# Patient Record
Sex: MALE | Race: ASIAN | NOT HISPANIC OR LATINO | URBAN - METROPOLITAN AREA
[De-identification: names, ages, dates, MRNs, and addresses within clinical notes are randomized per-mention and may not be internally consistent; named-entity substitution may affect disease eponyms.]

---

## 2020-03-04 ENCOUNTER — EMERGENCY (EMERGENCY)
Facility: HOSPITAL | Age: 65
LOS: 1 days | Discharge: HOME | End: 2020-03-04
Attending: EMERGENCY MEDICINE | Admitting: STUDENT IN AN ORGANIZED HEALTH CARE EDUCATION/TRAINING PROGRAM
Payer: SELF-PAY

## 2020-03-04 VITALS
HEART RATE: 66 BPM | DIASTOLIC BLOOD PRESSURE: 77 MMHG | TEMPERATURE: 98 F | RESPIRATION RATE: 16 BRPM | SYSTOLIC BLOOD PRESSURE: 171 MMHG | OXYGEN SATURATION: 100 %

## 2020-03-04 LAB
ALBUMIN SERPL ELPH-MCNC: 4.3 G/DL — SIGNIFICANT CHANGE UP (ref 3.3–5)
ALP SERPL-CCNC: 76 U/L — SIGNIFICANT CHANGE UP (ref 40–120)
ALT FLD-CCNC: 29 U/L — SIGNIFICANT CHANGE UP (ref 4–41)
ANION GAP SERPL CALC-SCNC: 14 MMO/L — SIGNIFICANT CHANGE UP (ref 7–14)
AST SERPL-CCNC: 17 U/L — SIGNIFICANT CHANGE UP (ref 4–40)
B PERT DNA SPEC QL NAA+PROBE: NOT DETECTED — SIGNIFICANT CHANGE UP
BASE EXCESS BLDV CALC-SCNC: 0.9 MMOL/L — SIGNIFICANT CHANGE UP
BASOPHILS # BLD AUTO: 0.03 K/UL — SIGNIFICANT CHANGE UP (ref 0–0.2)
BASOPHILS NFR BLD AUTO: 0.5 % — SIGNIFICANT CHANGE UP (ref 0–2)
BILIRUB SERPL-MCNC: 0.4 MG/DL — SIGNIFICANT CHANGE UP (ref 0.2–1.2)
BLOOD GAS VENOUS - CREATININE: 1.03 MG/DL — SIGNIFICANT CHANGE UP (ref 0.5–1.3)
BUN SERPL-MCNC: 12 MG/DL — SIGNIFICANT CHANGE UP (ref 7–23)
C PNEUM DNA SPEC QL NAA+PROBE: NOT DETECTED — SIGNIFICANT CHANGE UP
CALCIUM SERPL-MCNC: 9.3 MG/DL — SIGNIFICANT CHANGE UP (ref 8.4–10.5)
CHLORIDE BLDV-SCNC: 106 MMOL/L — SIGNIFICANT CHANGE UP (ref 96–108)
CHLORIDE SERPL-SCNC: 102 MMOL/L — SIGNIFICANT CHANGE UP (ref 98–107)
CO2 SERPL-SCNC: 22 MMOL/L — SIGNIFICANT CHANGE UP (ref 22–31)
CREAT SERPL-MCNC: 1.02 MG/DL — SIGNIFICANT CHANGE UP (ref 0.5–1.3)
EOSINOPHIL # BLD AUTO: 0.45 K/UL — SIGNIFICANT CHANGE UP (ref 0–0.5)
EOSINOPHIL NFR BLD AUTO: 6.8 % — HIGH (ref 0–6)
FLUAV H1 2009 PAND RNA SPEC QL NAA+PROBE: NOT DETECTED — SIGNIFICANT CHANGE UP
FLUAV H1 RNA SPEC QL NAA+PROBE: NOT DETECTED — SIGNIFICANT CHANGE UP
FLUAV H3 RNA SPEC QL NAA+PROBE: NOT DETECTED — SIGNIFICANT CHANGE UP
FLUAV SUBTYP SPEC NAA+PROBE: NOT DETECTED — SIGNIFICANT CHANGE UP
FLUBV RNA SPEC QL NAA+PROBE: NOT DETECTED — SIGNIFICANT CHANGE UP
GAS PNL BLDV: 138 MMOL/L — SIGNIFICANT CHANGE UP (ref 136–146)
GLUCOSE BLDV-MCNC: 213 MG/DL — HIGH (ref 70–99)
GLUCOSE SERPL-MCNC: 218 MG/DL — HIGH (ref 70–99)
HADV DNA SPEC QL NAA+PROBE: NOT DETECTED — SIGNIFICANT CHANGE UP
HCO3 BLDV-SCNC: 24 MMOL/L — SIGNIFICANT CHANGE UP (ref 20–27)
HCOV PNL SPEC NAA+PROBE: SIGNIFICANT CHANGE UP
HCT VFR BLD CALC: 40.5 % — SIGNIFICANT CHANGE UP (ref 39–50)
HCT VFR BLDV CALC: 44.4 % — SIGNIFICANT CHANGE UP (ref 39–51)
HGB BLD-MCNC: 14.1 G/DL — SIGNIFICANT CHANGE UP (ref 13–17)
HGB BLDV-MCNC: 14.5 G/DL — SIGNIFICANT CHANGE UP (ref 13–17)
HMPV RNA SPEC QL NAA+PROBE: NOT DETECTED — SIGNIFICANT CHANGE UP
HPIV1 RNA SPEC QL NAA+PROBE: NOT DETECTED — SIGNIFICANT CHANGE UP
HPIV2 RNA SPEC QL NAA+PROBE: NOT DETECTED — SIGNIFICANT CHANGE UP
HPIV3 RNA SPEC QL NAA+PROBE: NOT DETECTED — SIGNIFICANT CHANGE UP
HPIV4 RNA SPEC QL NAA+PROBE: NOT DETECTED — SIGNIFICANT CHANGE UP
IMM GRANULOCYTES NFR BLD AUTO: 0.3 % — SIGNIFICANT CHANGE UP (ref 0–1.5)
LACTATE BLDV-MCNC: 1.8 MMOL/L — SIGNIFICANT CHANGE UP (ref 0.5–2)
LYMPHOCYTES # BLD AUTO: 1.98 K/UL — SIGNIFICANT CHANGE UP (ref 1–3.3)
LYMPHOCYTES # BLD AUTO: 29.9 % — SIGNIFICANT CHANGE UP (ref 13–44)
MAGNESIUM SERPL-MCNC: 2.1 MG/DL — SIGNIFICANT CHANGE UP (ref 1.6–2.6)
MCHC RBC-ENTMCNC: 29.7 PG — SIGNIFICANT CHANGE UP (ref 27–34)
MCHC RBC-ENTMCNC: 34.8 % — SIGNIFICANT CHANGE UP (ref 32–36)
MCV RBC AUTO: 85.4 FL — SIGNIFICANT CHANGE UP (ref 80–100)
MONOCYTES # BLD AUTO: 0.56 K/UL — SIGNIFICANT CHANGE UP (ref 0–0.9)
MONOCYTES NFR BLD AUTO: 8.5 % — SIGNIFICANT CHANGE UP (ref 2–14)
NEUTROPHILS # BLD AUTO: 3.58 K/UL — SIGNIFICANT CHANGE UP (ref 1.8–7.4)
NEUTROPHILS NFR BLD AUTO: 54 % — SIGNIFICANT CHANGE UP (ref 43–77)
NRBC # FLD: 0 K/UL — SIGNIFICANT CHANGE UP (ref 0–0)
PCO2 BLDV: 48 MMHG — SIGNIFICANT CHANGE UP (ref 41–51)
PH BLDV: 7.35 PH — SIGNIFICANT CHANGE UP (ref 7.32–7.43)
PLATELET # BLD AUTO: 200 K/UL — SIGNIFICANT CHANGE UP (ref 150–400)
PMV BLD: 9.4 FL — SIGNIFICANT CHANGE UP (ref 7–13)
PO2 BLDV: 44 MMHG — HIGH (ref 35–40)
POTASSIUM BLDV-SCNC: 3.8 MMOL/L — SIGNIFICANT CHANGE UP (ref 3.4–4.5)
POTASSIUM SERPL-MCNC: 4 MMOL/L — SIGNIFICANT CHANGE UP (ref 3.5–5.3)
POTASSIUM SERPL-SCNC: 4 MMOL/L — SIGNIFICANT CHANGE UP (ref 3.5–5.3)
PROT SERPL-MCNC: 8.1 G/DL — SIGNIFICANT CHANGE UP (ref 6–8.3)
RBC # BLD: 4.74 M/UL — SIGNIFICANT CHANGE UP (ref 4.2–5.8)
RBC # FLD: 12.7 % — SIGNIFICANT CHANGE UP (ref 10.3–14.5)
RSV RNA SPEC QL NAA+PROBE: NOT DETECTED — SIGNIFICANT CHANGE UP
RV+EV RNA SPEC QL NAA+PROBE: DETECTED — HIGH
SAO2 % BLDV: 76.4 % — SIGNIFICANT CHANGE UP (ref 60–85)
SODIUM SERPL-SCNC: 138 MMOL/L — SIGNIFICANT CHANGE UP (ref 135–145)
TROPONIN T, HIGH SENSITIVITY: 15 NG/L — SIGNIFICANT CHANGE UP (ref ?–14)
TROPONIN T, HIGH SENSITIVITY: 16 NG/L — SIGNIFICANT CHANGE UP (ref ?–14)
WBC # BLD: 6.62 K/UL — SIGNIFICANT CHANGE UP (ref 3.8–10.5)
WBC # FLD AUTO: 6.62 K/UL — SIGNIFICANT CHANGE UP (ref 3.8–10.5)

## 2020-03-04 PROCEDURE — 71250 CT THORAX DX C-: CPT | Mod: 26

## 2020-03-04 PROCEDURE — 71046 X-RAY EXAM CHEST 2 VIEWS: CPT | Mod: 26

## 2020-03-04 PROCEDURE — 99218: CPT

## 2020-03-04 RX ORDER — AZITHROMYCIN 500 MG/1
500 TABLET, FILM COATED ORAL ONCE
Refills: 0 | Status: COMPLETED | OUTPATIENT
Start: 2020-03-04 | End: 2020-03-04

## 2020-03-04 RX ORDER — IPRATROPIUM/ALBUTEROL SULFATE 18-103MCG
3 AEROSOL WITH ADAPTER (GRAM) INHALATION ONCE
Refills: 0 | Status: COMPLETED | OUTPATIENT
Start: 2020-03-04 | End: 2020-03-04

## 2020-03-04 RX ORDER — IPRATROPIUM/ALBUTEROL SULFATE 18-103MCG
3 AEROSOL WITH ADAPTER (GRAM) INHALATION EVERY 6 HOURS
Refills: 0 | Status: DISCONTINUED | OUTPATIENT
Start: 2020-03-04 | End: 2020-03-11

## 2020-03-04 RX ORDER — IPRATROPIUM/ALBUTEROL SULFATE 18-103MCG
3 AEROSOL WITH ADAPTER (GRAM) INHALATION EVERY 6 HOURS
Refills: 0 | Status: DISCONTINUED | OUTPATIENT
Start: 2020-03-04 | End: 2020-03-04

## 2020-03-04 RX ORDER — ACETAMINOPHEN 500 MG
975 TABLET ORAL ONCE
Refills: 0 | Status: COMPLETED | OUTPATIENT
Start: 2020-03-04 | End: 2020-03-04

## 2020-03-04 RX ORDER — AZITHROMYCIN 500 MG/1
1 TABLET, FILM COATED ORAL
Qty: 4 | Refills: 0
Start: 2020-03-04 | End: 2020-03-07

## 2020-03-04 RX ADMIN — Medication 3 MILLILITER(S): at 16:12

## 2020-03-04 RX ADMIN — Medication 3 MILLILITER(S): at 09:36

## 2020-03-04 RX ADMIN — Medication 975 MILLIGRAM(S): at 23:53

## 2020-03-04 RX ADMIN — Medication 3 MILLILITER(S): at 22:29

## 2020-03-04 RX ADMIN — Medication 50 MILLIGRAM(S): at 13:05

## 2020-03-04 RX ADMIN — Medication 3 MILLILITER(S): at 13:05

## 2020-03-04 RX ADMIN — Medication 1 TABLET(S): at 12:31

## 2020-03-04 RX ADMIN — AZITHROMYCIN 500 MILLIGRAM(S): 500 TABLET, FILM COATED ORAL at 12:31

## 2020-03-04 NOTE — ED CDU PROVIDER INITIAL DAY NOTE - PROGRESS NOTE DETAILS
Patient received at sign out, pending am echo and possible Stress test. Pt Lungs CTA b/l. Spoke to patient son on phone, states he will come in AM. Pt resting comfortably. Will continue with current cdu plan and monitor closely.

## 2020-03-04 NOTE — ED CDU PROVIDER INITIAL DAY NOTE - NS ED ROS FT
ROS:  GENERAL: No fever, no chills  EYES: no change in vision  HEENT: no trouble swallowing, no trouble speaking  CARDIAC: +chest pain  PULMONARY: +cough, +shortness of breath, +wheezes  GI: no abdominal pain, no nausea, no vomiting, no diarrhea, no constipation  : No dysuria, no frequency, no change in appearance, or odor of urine  SKIN: no rashes  NEURO: no headache  MSK: No joint pain

## 2020-03-04 NOTE — ED PROVIDER NOTE - OBJECTIVE STATEMENT
65yom w/ HTN has been having 3 days of dry cough and shortness of breath associated with sharp right sided chest pain worse with inspiration and coughing. Gradual onset of symptoms with no provoking event, constant since onset, taking OTC cough medicines without relief. No fevers, chills, sick contacts, travel or known exposures. Does endorses intermittent chest pain and shortness of breath on and off for several months.

## 2020-03-04 NOTE — ED CDU PROVIDER INITIAL DAY NOTE - ATTENDING CONTRIBUTION TO CARE
Seen and examined, have discussed plan of care with PA.   I agree with note as stated above, having amended the EMR as needed to reflect the findings.   GEN - NAD; well appearing; A+O x3   HEAD - NC/AT     EYES - EOMI, no conjunctival pallor, no scleral icterus  ENT -   mucous membranes  moist , no discharge      NECK - Neck supple  PULM - CTA b/l,  symmetric breath sounds  COR -  RRR, S1 S2, no murmurs  ABD -diffuse wheezes.   BACK - no CVA tenderness, nontender spine     EXTREMS - no edema, no deformity, warm and well perfused    SKIN - no rash or bruising      NEUROLOGIC - alert, sensation nl, motor 5/5 RUE/LUE/RLE/LLE

## 2020-03-04 NOTE — ED CDU PROVIDER INITIAL DAY NOTE - PHYSICAL EXAMINATION
Vital signs reviewed.   CONSTITUTIONAL: Well-appearing; well-nourished; in no apparent distress. Non-toxic appearing.   HEAD: Normocephalic, atraumatic.  EYES: PERRL, EOM intact, conjunctiva and sclera WNL.  ENT: normal nose; no rhinorrhea; normal pharynx with no tonsillar hypertrophy, no erythema, no exudate, no lymphadenopathy.  NECK/LYMPH: Supple; non-tender; no cervical lymphadenopathy.  CARD: Normal S1, S2; RRR. No lower extremity edema or calf tenderness eddie.   RESP: Normal chest excursion with respiration; diffuse wheezing  ABD/GI: soft, non-distended; non-tender; no palpable organomegaly, no pulsatile mass.  EXT/MS: moves all extremities; distal pulses are normal, no pedal edema.  SKIN: Normal for age and race; warm; dry; good turgor; no apparent lesions or exudate noted.  NEURO: Awake, alert, oriented x 3, no gross deficits, CN II-XII grossly intact, no motor or sensory deficit noted.  PSYCH: Normal mood; appropriate affect.

## 2020-03-04 NOTE — ED CDU PROVIDER INITIAL DAY NOTE - OBJECTIVE STATEMENT
65yom w/ HTN has been having 3 days of dry cough and shortness of breath associated with sharp right sided chest pain worse with inspiration and coughing. Gradual onset of symptoms with no provoking event, constant since onset, taking OTC cough medicines without relief. No fevers, chills, sick contacts, travel or known exposures. Does endorses intermittent chest pain and shortness of breath on and off for several months.    CDU PRASHANT Brown: Agree with above except as noted. Pt is a 65 year old male no known PMHx, moved here from [place], here c/o right sided chest pain w/ associated shortness of breath x several days. No recent travel or sick contacts. No lower extremity edema or calf pain. No known cardiac hx however has never seen a cardiologist. Pt was diagnosed clinically w/ PNA given RLL rales on exam. EKG done showed T waves in V5 and V6 with no prior. Pt sent to CDU for cardiology eval and stress testing. On exam, pt has noted to developed diffuse wheezes. Plan for duonebs, steroids, and cardiology eval in CDU.

## 2020-03-04 NOTE — CONSULT NOTE ADULT - SUBJECTIVE AND OBJECTIVE BOX
HISTORY OF PRESENT ILLNESS: HPI:    Patient speaks Adina, requests his daughter in law Adriano translate our conversation.     65 y.o male with PMHX of HTN who presented to ED c/o 3 days of dry cough and shortness of breath associated with sharp right sided chest pain worse with inspiration and coughing. Patient states his symptoms have progressively been getting worse and therefore decided to come to ED for further evaluation. Per daughter-in-law Adriano, pt was resting in couch yesterday when he started to c/o sob with breathing accompanied by cough. Patient states the pain is intermittent without pressure or radiation to neck/jaw/arm and that chest pain correlates with cough. Patient denies f/u with PCP or cardiology in the past. Per Adriano, pt does not regularly follow with doctors back home in [facility] and not since arriving in [place] in November of last year. Per patient, his symptoms have been present for several weeks to months, but in the past few day have become worse. Denies cardiac work up and hx of MI, CAD, stents. Denies palma, palpitations, le edema or weakness, abd pain, n/v.       PAST MEDICAL & SURGICAL HISTORY:  HTN (hypertension)  No significant past surgical history      MEDICATIONS:  MEDICATIONS  (STANDING):  albuterol/ipratropium for Nebulization. 3 milliLiter(s) Nebulizer every 6 hours      Allergies    No Known Allergies    Intolerances      FAMILY HISTORY:    Non-contributary for premature coronary disease or sudden cardiac death    SOCIAL HISTORY:    [ X] Non-smoker  [ ] Smoker  [ ] Alcohol    FLU VACCINE THIS YEAR STARTS IN AUGUST:  [ ] Yes    [X ] No    IF OVER 65 HAVE YOU EVER HAD A PNA VACCINE:  [ ] Yes    [ X] No       [ ] N/A      REVIEW OF SYSTEMS:  [X ]chest pain  [ X ]shortness of breath  [  ]palpitations  [  ]syncope  [ ]near syncope [ ]upper extremity weakness   [ ] lower extremity weakness  [  ]diplopia  [  ]altered mental status   [  ]fevers  [ ]chills [ ]nausea  [ ]vomitting  [  ]dysphagia    [ ]abdominal pain  [ ]melena  [ ]BRBPR    [  ]epistaxis  [  ]rash    [ ]lower extremity edema      [X ] All others negative	  [ ] Unable to obtain      LABS:	 	    CARDIAC MARKERS:                         14.1   6.62  )-----------( 200      ( 04 Mar 2020 09:30 )             40.5     Hb Trend: 14.1<--    03-04    138  |  102  |  12  ----------------------------<  218<H>  4.0   |  22  |  1.02    Ca    9.3      04 Mar 2020 09:30  Mg     2.1     03-04    TPro  8.1  /  Alb  4.3  /  TBili  0.4  /  DBili  x   /  AST  17  /  ALT  29  /  AlkPhos  76  03-04    Creatinine Trend: 1.02<--    Coags:      proBNP: Serum Pro-Brain Natriuretic Peptide: 10.84 pg/mL (03-04 @ 09:30)    Lipid Profile:   HgA1c:   TSH:       PHYSICAL EXAM:  T(C): 36.2 (03-04-20 @ 13:03), Max: 36.7 (03-04-20 @ 09:02)  HR: 72 (03-04-20 @ 13:03) (66 - 72)  BP: 186/87 (03-04-20 @ 13:03) (171/77 - 186/87)  RR: 14 (03-04-20 @ 13:03) (14 - 16)  SpO2: 99% (03-04-20 @ 13:03) (99% - 100%)  Wt(kg): --     I&O's Summary      Gen: Appears well in NAD  HEENT:  (-)icterus (-)pallor  CV: N S1 S2 1/6 TOMER (+)2 Pulses B/l  Resp:  diminished at bases,  B/L, normal effort  GI: (+) BS Soft, NT, ND  Lymph:  (-)Edema, (-)obvious lymphadenopathy  Skin: Warm to touch, Normal turgor  Psych: Appropriate mood and affect        TELEMETRY: 	      ECG:  	    RADIOLOGY:         CXR:     ASSESSMENT/PLAN: 	65y Male HISTORY OF PRESENT ILLNESS: HPI:    Patient speaks Adina, requests his daughter in law Adriano translate our conversation.     65 y.o male with PMHX of HTN who presented to ED c/o 3 days of dry cough and shortness of breath associated with sharp right sided chest pain worse with inspiration and coughing. Patient states his symptoms have progressively been getting worse and therefore decided to come to ED for further evaluation. Per daughter-in-law Adriano, pt was resting in couch yesterday when he started to c/o sob with breathing accompanied by cough. Patient states the pain is intermittent without pressure or radiation to neck/jaw/arm and that chest pain correlates with cough. Patient denies f/u with PCP or cardiology in the past. Per Adriano, pt does not regularly follow with doctors back home in [facility] and not since arriving in [place] in November of last year. Per patient, his symptoms have been present for several weeks to months, but in the past few day have become worse. Denies cardiac work up and hx of MI, CAD, stents. Denies palma, palpitations, le edema or weakness, abd pain, n/v. Cardiology consulted for r/o ACS.       PAST MEDICAL & SURGICAL HISTORY:  HTN (hypertension)  No significant past surgical history      MEDICATIONS:  MEDICATIONS  (STANDING):  albuterol/ipratropium for Nebulization. 3 milliLiter(s) Nebulizer every 6 hours      Allergies    No Known Allergies    Intolerances      FAMILY HISTORY:    Non-contributary for premature coronary disease or sudden cardiac death    SOCIAL HISTORY:    [ X] Non-smoker  [ ] Smoker  [ ] Alcohol    FLU VACCINE THIS YEAR STARTS IN AUGUST:  [ ] Yes    [X ] No    IF OVER 65 HAVE YOU EVER HAD A PNA VACCINE:  [ ] Yes    [ X] No       [ ] N/A      REVIEW OF SYSTEMS:  [X ]chest pain  [ X ]shortness of breath  [  ]palpitations  [  ]syncope  [ ]near syncope [ ]upper extremity weakness   [ ] lower extremity weakness  [  ]diplopia  [  ]altered mental status   [  ]fevers  [ ]chills [ ]nausea  [ ]vomitting  [  ]dysphagia    [ ]abdominal pain  [ ]melena  [ ]BRBPR    [  ]epistaxis  [  ]rash    [ ]lower extremity edema      [X ] All others negative	  [ ] Unable to obtain      LABS:	 	    CARDIAC MARKERS:                         14.1   6.62  )-----------( 200      ( 04 Mar 2020 09:30 )             40.5     Hb Trend: 14.1<--    03-04    138  |  102  |  12  ----------------------------<  218<H>  4.0   |  22  |  1.02    Ca    9.3      04 Mar 2020 09:30  Mg     2.1     03-04    TPro  8.1  /  Alb  4.3  /  TBili  0.4  /  DBili  x   /  AST  17  /  ALT  29  /  AlkPhos  76  03-04    Creatinine Trend: 1.02<--    Coags:      proBNP: Serum Pro-Brain Natriuretic Peptide: 10.84 pg/mL (03-04 @ 09:30)    Lipid Profile:   HgA1c:   TSH:       PHYSICAL EXAM:  T(C): 36.2 (03-04-20 @ 13:03), Max: 36.7 (03-04-20 @ 09:02)  HR: 72 (03-04-20 @ 13:03) (66 - 72)  BP: 186/87 (03-04-20 @ 13:03) (171/77 - 186/87)  RR: 14 (03-04-20 @ 13:03) (14 - 16)  SpO2: 99% (03-04-20 @ 13:03) (99% - 100%)  Wt(kg): --     I&O's Summary      Gen: Appears well in NAD  HEENT:  (-)icterus (-)pallor  CV: N S1 S2 1/6 TOMER (+)2 Pulses B/l  Resp:  diminished at bases,  B/L, normal effort  GI: (+) BS Soft, NT, ND  Lymph:  (-)Edema, (-)obvious lymphadenopathy  Skin: Warm to touch, Normal turgor  Psych: Appropriate mood and affect    TELEMETRY: 	  NSR     ECG:  	NSR, with LVH and Narrow QRS     RADIOLOGY:         CXR:   < from: Xray Chest 2 Views PA/Lat (03.04.20 @ 11:30) >  IMPRESSION: Normal chest    PATIENCE CARLTON M.D., ATTENDING RADIOLOGIST  This document has been electronically signed. Mar  4 2020 11:29AM    < end of copied text >      ECHO  PENDING     ASSESSMENT/PLAN: 	    65 y.o male with PMHX of HTN who presented to ED c/o 3 days of dry cough and shortness of breath associated with sharp right sided chest pain worse with inspiration and coughing. Patient states his symptoms have progressively been getting worse and therefore decided to come to ED for further evaluation. Per daughter-in-law Adriano, pt was resting in couch yesterday when he started to c/o sob with breathing accompanied by cough. Patient states the pain is intermittent without pressure or radiation to neck/jaw/arm and that chest pain correlates with cough. Patient denies f/u with PCP or cardiology in the past. Per Adriano, pt does not regularly follow with doctors back home in [facility] and not since arriving in [place] in November of last year. Per patient, his symptoms have been present for several weeks to months, but in the past few day have become worse. Denies cardiac work up and hx of MI, CAD, stents. Cardiology consulted for r/o ACS.       -- pt with intermittent cp associated with sob and cough  -- presented to ED c/o sob, cough, has scattered wheezing throughout on eval   -- cont duonebs, consider pulm consult in setting of cough/wheezing  -- cont tele, currently NSR   -- check echo eval lv function, structural heart  -- if respiratory status improves would check NST eval for CAD (pt w/no prev hx or recent work up)  -- further reccs pending above   -- appreciate CDU care and f/u HISTORY OF PRESENT ILLNESS: HPI:    Patient speaks Adina, requests his daughter in law Adriano translate our conversation.     65 y.o male with PMHX of HTN who presented to ED c/o 3 days of dry cough and shortness of breath associated with sharp right sided chest pain worse with inspiration and coughing. Patient states his symptoms have progressively been getting worse and therefore decided to come to ED for further evaluation. Per daughter-in-law Adriano, pt was resting in couch yesterday when he started to c/o sob with breathing accompanied by cough. Patient states the pain is intermittent without pressure or radiation to neck/jaw/arm and that chest pain correlates with cough. Patient denies f/u with PCP or cardiology in the past. Per Adriano, pt does not regularly follow with doctors back home in [facility] and not since arriving in [place] in November of last year. Per patient, his symptoms have been present for several weeks to months, but in the past few day have become worse. Denies cardiac work up and hx of MI, CAD, stents. Denies palma, palpitations, le edema or weakness, abd pain, n/v. Cardiology consulted for r/o ACS.       PAST MEDICAL & SURGICAL HISTORY:  HTN (hypertension)  No significant past surgical history      MEDICATIONS:  MEDICATIONS  (STANDING):  albuterol/ipratropium for Nebulization. 3 milliLiter(s) Nebulizer every 6 hours      Allergies    No Known Allergies    Intolerances      FAMILY HISTORY:    Non-contributary for premature coronary disease or sudden cardiac death    SOCIAL HISTORY:    [ X] Non-smoker  [ ] Smoker  [ ] Alcohol    FLU VACCINE THIS YEAR STARTS IN AUGUST:  [ ] Yes    [X ] No    IF OVER 65 HAVE YOU EVER HAD A PNA VACCINE:  [ ] Yes    [ X] No       [ ] N/A      REVIEW OF SYSTEMS:  [X ]chest pain  [ X ]shortness of breath  [  ]palpitations  [  ]syncope  [ ]near syncope [ ]upper extremity weakness   [ ] lower extremity weakness  [  ]diplopia  [  ]altered mental status   [  ]fevers  [ ]chills [ ]nausea  [ ]vomitting  [  ]dysphagia    [ ]abdominal pain  [ ]melena  [ ]BRBPR    [  ]epistaxis  [  ]rash    [ ]lower extremity edema      [X ] All others negative	  [ ] Unable to obtain      LABS:	 	    CARDIAC MARKERS:                         14.1   6.62  )-----------( 200      ( 04 Mar 2020 09:30 )             40.5     Hb Trend: 14.1<--    03-04    138  |  102  |  12  ----------------------------<  218<H>  4.0   |  22  |  1.02    Ca    9.3      04 Mar 2020 09:30  Mg     2.1     03-04    TPro  8.1  /  Alb  4.3  /  TBili  0.4  /  DBili  x   /  AST  17  /  ALT  29  /  AlkPhos  76  03-04    Creatinine Trend: 1.02<--    Coags:      proBNP: Serum Pro-Brain Natriuretic Peptide: 10.84 pg/mL (03-04 @ 09:30)    Lipid Profile:   HgA1c:   TSH:       PHYSICAL EXAM:  T(C): 36.2 (03-04-20 @ 13:03), Max: 36.7 (03-04-20 @ 09:02)  HR: 72 (03-04-20 @ 13:03) (66 - 72)  BP: 186/87 (03-04-20 @ 13:03) (171/77 - 186/87)  RR: 14 (03-04-20 @ 13:03) (14 - 16)  SpO2: 99% (03-04-20 @ 13:03) (99% - 100%)  Wt(kg): --     I&O's Summary      Gen: Appears well in NAD  HEENT:  (-)icterus (-)pallor  CV: N S1 S2 1/6 TOMER (+)2 Pulses B/l  Resp:  diminished at bases,  B/L, normal effort  GI: (+) BS Soft, NT, ND  Lymph:  (-)Edema, (-)obvious lymphadenopathy  Skin: Warm to touch, Normal turgor  Psych: Appropriate mood and affect    TELEMETRY: 	  NSR     ECG:  	NSR, with LVH and Narrow QRS     RADIOLOGY:         CXR:   < from: Xray Chest 2 Views PA/Lat (03.04.20 @ 11:30) >  IMPRESSION: Normal chest    PATIENCE CARLTON M.D., ATTENDING RADIOLOGIST  This document has been electronically signed. Mar  4 2020 11:29AM    < end of copied text >      ECHO  PENDING     ASSESSMENT/PLAN: 	    65 y.o male with PMHX of HTN who presented to ED c/o 3 days of dry cough and shortness of breath associated with sharp right sided chest pain worse with inspiration and coughing. Patient states his symptoms have progressively been getting worse and therefore decided to come to ED for further evaluation. Per daughter-in-law Adriano, pt was resting in couch yesterday when he started to c/o sob with breathing accompanied by cough. Patient states the pain is intermittent without pressure or radiation to neck/jaw/arm and that chest pain correlates with cough. Patient denies f/u with PCP or cardiology in the past. Per Adriano, pt does not regularly follow with doctors back home in [facility] and not since arriving in [place] in November of last year. Per patient, his symptoms have been present for several weeks to months, but in the past few day have become worse. Denies cardiac work up and hx of MI, CAD, stents. Cardiology consulted for r/o ACS.       -- pt with intermittent cp associated with sob and cough  -- presented to ED c/o sob, cough, has scattered wheezing throughout on eval   -- cont duonebs, consider pulm consult in setting of cough/wheezing  -- trop neg thus far, check EKG w/chest pain   -- cont tele, currently NSR   -- check echo eval lv function, structural heart  -- if respiratory status improves would check NST eval for CAD (pt w/no prev hx or recent work up)  -- further reccs pending above   -- appreciate CDU care and f/u

## 2020-03-04 NOTE — ED PROVIDER NOTE - PROGRESS NOTE DETAILS
Tisha: symptoms improved with nebulizer in ED, reassuring labs. given focal lung findings on exam will treat for clinical pneumonia. Discussed all results, treatment plan and follow up instructions. Provided with medicine and cardiology follow ups to establish care.

## 2020-03-04 NOTE — ED PROVIDER NOTE - ATTENDING CONTRIBUTION TO CARE
I have personally performed a face to face bedside history and physical examination of this patient. I have discussed the history, examination, review of systems, assessment and plan of management with the resident. I have reviewed the electronic medical record and amended it to reflect my history, review of systems, physical exam, assessment and plan.    65yom w/ HTN has been having 3 days of dry cough and shortness of breath associated with sharp right sided chest pain worse with inspiration and coughing. Gradual onset of symptoms with no provoking event, constant since onset, taking OTC cough medicines without relief. No fevers, chills, sick contacts, travel or known exposures. Does endorses intermittent chest pain and shortness of breath on and off for several months.  VSS.  Exam non-toxic appearing, lungs with b/l wheeze no crackles, no jvd, no murmur or rub on heart sounds, no le swelling.  EKG with twi, no stemi criteria.  Patient without asthma history.  Low c/f acs.  Does not appear acutely volume overloaded.  Possible reactive airway due to infectious etiology.  Will send labs, cxr, supportive care.  Dispo pending.

## 2020-03-04 NOTE — ED CDU PROVIDER INITIAL DAY NOTE - MEDICAL DECISION MAKING DETAILS
Chest pain, cough- concern for possible PNA vs viral illness, however due to abnl EKG and no prior for comparison, will consult cardiology, ct chest to eval for possible PNA

## 2020-03-04 NOTE — ED PROVIDER NOTE - CLINICAL SUMMARY MEDICAL DECISION MAKING FREE TEXT BOX
dry cough and chest pain with lung exam suggestive of possible pneumonia, no fever or leukocytosis but will treat empirically for CAP. ECG with likely LVH changes but no prior available for comparison. Will place in CDU for cardiology evaluation and likely stress. History not suggestive of PE.

## 2020-03-04 NOTE — ED ADULT NURSE NOTE - NSIMPLEMENTINTERV_GEN_ALL_ED
Implemented All Universal Safety Interventions:  Chicken to call system. Call bell, personal items and telephone within reach. Instruct patient to call for assistance. Room bathroom lighting operational. Non-slip footwear when patient is off stretcher. Physically safe environment: no spills, clutter or unnecessary equipment. Stretcher in lowest position, wheels locked, appropriate side rails in place.

## 2020-03-05 VITALS
HEART RATE: 75 BPM | TEMPERATURE: 97 F | SYSTOLIC BLOOD PRESSURE: 168 MMHG | OXYGEN SATURATION: 99 % | RESPIRATION RATE: 14 BRPM | DIASTOLIC BLOOD PRESSURE: 88 MMHG

## 2020-03-05 LAB
ALBUMIN SERPL ELPH-MCNC: 4.4 G/DL — SIGNIFICANT CHANGE UP (ref 3.3–5)
ALP SERPL-CCNC: 67 U/L — SIGNIFICANT CHANGE UP (ref 40–120)
ALT FLD-CCNC: 29 U/L — SIGNIFICANT CHANGE UP (ref 4–41)
ANION GAP SERPL CALC-SCNC: 15 MMO/L — HIGH (ref 7–14)
AST SERPL-CCNC: 18 U/L — SIGNIFICANT CHANGE UP (ref 4–40)
BASE EXCESS BLDV CALC-SCNC: 1.1 MMOL/L — SIGNIFICANT CHANGE UP
BASOPHILS # BLD AUTO: 0.02 K/UL — SIGNIFICANT CHANGE UP (ref 0–0.2)
BASOPHILS NFR BLD AUTO: 0.2 % — SIGNIFICANT CHANGE UP (ref 0–2)
BILIRUB SERPL-MCNC: 0.5 MG/DL — SIGNIFICANT CHANGE UP (ref 0.2–1.2)
BLOOD GAS VENOUS - CREATININE: 0.89 MG/DL — SIGNIFICANT CHANGE UP (ref 0.5–1.3)
BLOOD GAS VENOUS - FIO2: 21 — SIGNIFICANT CHANGE UP
BUN SERPL-MCNC: 14 MG/DL — SIGNIFICANT CHANGE UP (ref 7–23)
CALCIUM SERPL-MCNC: 10.1 MG/DL — SIGNIFICANT CHANGE UP (ref 8.4–10.5)
CHLORIDE BLDV-SCNC: 106 MMOL/L — SIGNIFICANT CHANGE UP (ref 96–108)
CHLORIDE SERPL-SCNC: 100 MMOL/L — SIGNIFICANT CHANGE UP (ref 98–107)
CO2 SERPL-SCNC: 24 MMOL/L — SIGNIFICANT CHANGE UP (ref 22–31)
CREAT SERPL-MCNC: 0.97 MG/DL — SIGNIFICANT CHANGE UP (ref 0.5–1.3)
EOSINOPHIL # BLD AUTO: 0.02 K/UL — SIGNIFICANT CHANGE UP (ref 0–0.5)
EOSINOPHIL NFR BLD AUTO: 0.2 % — SIGNIFICANT CHANGE UP (ref 0–6)
GAS PNL BLDV: 141 MMOL/L — SIGNIFICANT CHANGE UP (ref 136–146)
GLUCOSE BLDV-MCNC: 178 MG/DL — HIGH (ref 70–99)
GLUCOSE SERPL-MCNC: 188 MG/DL — HIGH (ref 70–99)
HCO3 BLDV-SCNC: 25 MMOL/L — SIGNIFICANT CHANGE UP (ref 20–27)
HCT VFR BLD CALC: 42.7 % — SIGNIFICANT CHANGE UP (ref 39–50)
HCT VFR BLDV CALC: 37.2 % — LOW (ref 39–51)
HGB BLD-MCNC: 14.9 G/DL — SIGNIFICANT CHANGE UP (ref 13–17)
HGB BLDV-MCNC: 12.1 G/DL — LOW (ref 13–17)
IMM GRANULOCYTES NFR BLD AUTO: 0.3 % — SIGNIFICANT CHANGE UP (ref 0–1.5)
LACTATE BLDV-MCNC: 3.2 MMOL/L — HIGH (ref 0.5–2)
LYMPHOCYTES # BLD AUTO: 1.35 K/UL — SIGNIFICANT CHANGE UP (ref 1–3.3)
LYMPHOCYTES # BLD AUTO: 14.9 % — SIGNIFICANT CHANGE UP (ref 13–44)
MCHC RBC-ENTMCNC: 30.5 PG — SIGNIFICANT CHANGE UP (ref 27–34)
MCHC RBC-ENTMCNC: 34.9 % — SIGNIFICANT CHANGE UP (ref 32–36)
MCV RBC AUTO: 87.5 FL — SIGNIFICANT CHANGE UP (ref 80–100)
MONOCYTES # BLD AUTO: 0.72 K/UL — SIGNIFICANT CHANGE UP (ref 0–0.9)
MONOCYTES NFR BLD AUTO: 7.9 % — SIGNIFICANT CHANGE UP (ref 2–14)
NEUTROPHILS # BLD AUTO: 6.95 K/UL — SIGNIFICANT CHANGE UP (ref 1.8–7.4)
NEUTROPHILS NFR BLD AUTO: 76.5 % — SIGNIFICANT CHANGE UP (ref 43–77)
NRBC # FLD: 0 K/UL — SIGNIFICANT CHANGE UP (ref 0–0)
PCO2 BLDV: 48 MMHG — SIGNIFICANT CHANGE UP (ref 41–51)
PH BLDV: 7.36 PH — SIGNIFICANT CHANGE UP (ref 7.32–7.43)
PLATELET # BLD AUTO: 220 K/UL — SIGNIFICANT CHANGE UP (ref 150–400)
PMV BLD: 9.5 FL — SIGNIFICANT CHANGE UP (ref 7–13)
PO2 BLDV: 55 MMHG — HIGH (ref 35–40)
POTASSIUM BLDV-SCNC: 3.9 MMOL/L — SIGNIFICANT CHANGE UP (ref 3.4–4.5)
POTASSIUM SERPL-MCNC: 4 MMOL/L — SIGNIFICANT CHANGE UP (ref 3.5–5.3)
POTASSIUM SERPL-SCNC: 4 MMOL/L — SIGNIFICANT CHANGE UP (ref 3.5–5.3)
PROT SERPL-MCNC: 8.2 G/DL — SIGNIFICANT CHANGE UP (ref 6–8.3)
RBC # BLD: 4.88 M/UL — SIGNIFICANT CHANGE UP (ref 4.2–5.8)
RBC # FLD: 12.9 % — SIGNIFICANT CHANGE UP (ref 10.3–14.5)
SAO2 % BLDV: 87.4 % — HIGH (ref 60–85)
SODIUM SERPL-SCNC: 139 MMOL/L — SIGNIFICANT CHANGE UP (ref 135–145)
WBC # BLD: 9.09 K/UL — SIGNIFICANT CHANGE UP (ref 3.8–10.5)
WBC # FLD AUTO: 9.09 K/UL — SIGNIFICANT CHANGE UP (ref 3.8–10.5)

## 2020-03-05 PROCEDURE — 78452 HT MUSCLE IMAGE SPECT MULT: CPT | Mod: 26

## 2020-03-05 PROCEDURE — 93016 CV STRESS TEST SUPVJ ONLY: CPT | Mod: GC

## 2020-03-05 PROCEDURE — 93018 CV STRESS TEST I&R ONLY: CPT | Mod: GC

## 2020-03-05 PROCEDURE — 93306 TTE W/DOPPLER COMPLETE: CPT | Mod: 26

## 2020-03-05 PROCEDURE — 99217: CPT

## 2020-03-05 RX ADMIN — Medication 975 MILLIGRAM(S): at 00:30

## 2020-03-05 NOTE — ED CDU PROVIDER DISPOSITION NOTE - PATIENT PORTAL LINK FT
You can access the FollowMyHealth Patient Portal offered by [facility] by registering at the following website: [email or web address]. By joining [facility]’s FollowMyHealth portal, you will also be able to view your health information using other applications (apps) compatible with our system.

## 2020-03-05 NOTE — ED CDU PROVIDER DISPOSITION NOTE - CLINICAL COURSE
65 y.o male with PMHx of HTN, non-compliant with medication who presented to ED c/o Rt side chest pain a/w shortness of breath. Pt moved to [place] 4 months ago from [place].  Pt seen and worked up in ED was diagnosed clinically w/ PNA given RLL rales on exam given Zithro and Augmentin. EKG showed T waves in V5 and V6 with no prior. Pt sent to CDU for cardiology eval and stress testing. While in CDU, pt noted to have diffuse wheezes was given duonebs, steroids, with good relief. Pt seen by Cards, recommend Echo, and stress if respiratory symptoms resolve. Overnight - no events on telemetry, patient had negative stress and echo this am - was seen y cardiology Dr. Moore  - states stress and echo normal and patient stable for dc at this time with outpt cardiology clinic follow up. Vital signs stable and pt. stable for DC at this time.

## 2020-03-05 NOTE — ED CDU PROVIDER SUBSEQUENT DAY NOTE - ATTENDING CONTRIBUTION TO CARE
GEN: no acute respiratory distress. nontoxic, speaking comfortably in full sentences, ambulating with steady gait.  HEENT: NCAT. MMM, oropharynx wnl.  Neck: no JVD, trachea midline, no LAD  CV: RRR. +S1S2, no murmur.   Chest: CTA B/l. no wheezing, rales, rhonchi. no retractions. good air movement. no tenderness. no rash   ABD: +BS, soft, non distended, non tender.   : no cva or suprapubic tenderness  MSK: No clubbing, cyanosis, edema. FROM of all extremities. no tenderness to palpation. No midline or paraspinal tenderness.   Neuro: AAOX3. Motor/Sensation grossly intact  SKIN: No  rash    66 yo M past medical history HTN (son translating at patient's preference) presents for right sided chest pain, sob, cough in CDU for further monitoring echo/stress. cardiology rec appreciated. patient feeling better, symtpoms resolved. echo, stress, reassess.

## 2020-03-05 NOTE — ED CDU PROVIDER DISPOSITION NOTE - NSFOLLOWUPINSTRUCTIONS_ED_ALL_ED_FT
REST, NO STRENUOUS ACTIVITY  TAKE MEDICATIONS AS DIRECTED  GIVEN COPIES OF REPORTS  **PLEASE FOLLOW UP WITH REGULAR DOCTOR AND CARDIOLOGIST AS SOON AS POSSIBLE**  [facility] CARDIOLOGY [phone]  RETURN TO ER FOR WORSENING SYMPTOMS

## 2020-03-05 NOTE — PROGRESS NOTE ADULT - SUBJECTIVE AND OBJECTIVE BOX
Patient denies chest pain or shortness of breath.   Review of systems otherwise (-)  	    MEDICATIONS  (STANDING):  predniSONE   Tablet 50 milliGRAM(s) Oral daily      LABS:	 	                          14.9   9.09  )-----------( 220      ( 05 Mar 2020 06:26 )             42.7     139  |  100  |  14  ----------------------------<  188<H>  4.0   |  24  |  0.97    Ca    10.1      05 Mar 2020 06:26  Mg     2.1     03-04    TPro  8.2  /  Alb  4.4  /  TBili  0.5  /  DBili  x   /  AST  18  /  ALT  29  /  AlkPhos  67  03-05    PHYSICAL EXAM:  T(C): 36.3 (03-05-20 @ 15:36), Max: 36.7 (03-04-20 @ 17:03)  HR: 75 (03-05-20 @ 15:36) (75 - 94)  BP: 168/88 (03-05-20 @ 15:36) (129/73 - 175/75)  RR: 14 (03-05-20 @ 15:36) (14 - 20)  SpO2: 99% (03-05-20 @ 15:36) (97% - 99%)    Gen: Appears well in NAD  HEENT:  (-)icterus (-)pallor  CV: N S1 S2 1/6 TOMER (+)2 Pulses B/l  Resp:  Clear to ausculatation B/L, normal effort  GI: (+) BS Soft, NT, ND  Lymph:  (-)Edema, (-)obvious lymphadenopathy  Skin: Warm to touch, Normal turgor  Psych: Appropriate mood and affect	      DATA    < from: Nuclear Stress Test-Pharmacologic (03.05.20 @ 10:56) >  GATED ANALYSIS:  Post-stressgated wall motion analysis was performed (LVEF  = 61 %;LVEDV = 100 ml.), revealing normal LV function. RV  function appeared normal.  ------------------------------------------------------------------------  IMPRESSIONS:Normal Study  * Myocardial Perfusion SPECT results are normal.  * The left ventricle was normal in size. Normal myocardial  perfusion scan,with no evidence of infarction or inducible  ischemia. There was a small, mild inferior / inferoseptal  defect that was no longer evident on prone imaging for  attenuation correction.  * Post-stress gated wall motion analysis was performed  (LVEF = 61 %;LVEDV = 100 ml.), revealing normal LV  function. RV function appeared normal.  * No clear evidence of ischemia or infarct.  ------------------------------------------------------------------------  Confirmed on  3/5/2020 - 12:05:11 by Carlos Hicks M.D.    < end of copied text >      < from: TTE with Doppler (w/Cont) (03.05.20 @ 08:01) >  CONCLUSIONS:  1. Calcified trileaflet aortic valve with normal opening.  2. Normal left ventricular internal dimensions and wall  thicknesses.  3. Endocardium not well visualized; grossly normal left  ventricular systolic function. Endocardial visualization  enhanced with intravenous injection of echo contrast  (Definity).  4. Normal right ventricular size and function.  ------------------------------------------------------------------------  Confirmed on  3/5/2020 - 14:44:26 by Anson Pozo M.D. [facility]    < end of copied text >        ASSESSMENT/PLAN: 	    65 y.o male with PMHX of HTN who presented to ED c/o 3 days of dry cough and shortness of breath associated with sharp right sided chest pain worse with inspiration and coughing.  Patient denies f/u with PCP or cardiology in the past. Cardiology consulted for r/o ACS.     --Troponins indeterminate  --found with Enterovirus  --TTE and NST WNL as above  --no further cardiac work up  --can fu with Cardiology Clinic after DC as needed

## 2020-03-05 NOTE — ED CDU PROVIDER SUBSEQUENT DAY NOTE - PROGRESS NOTE DETAILS
Son at bedside, offering translation, pt and son aware of liver lesion on CT and need to follow up. Pt lungs reassess and clear. Will discuss with day team about possible stress test as per cards recs.

## 2020-03-05 NOTE — ED CDU PROVIDER SUBSEQUENT DAY NOTE - MEDICAL DECISION MAKING DETAILS
Patient is a 65 y.o male with PMHx of HTN, non-compliant with medication who presented to ED c/o Rt side chest pain a/w shortness of breath. [place] moved [place] 4 [place] ago from [place].  Pt seen and worked up in ED was diagnosed clinically w/ PNA given RLL rales on exam given Zithro and Augmentin. EKG showed T waves in V5 and V6 with no prior. Pt sent to CDU for cardiology eval and stress testing. While in CDU, pt noted to have diffuse wheezes was given duonebs, steroids, with good relief. Pt seen by Cards, recommend Echo, and stress if respiratory symptoms resolve. Pt pending stress/echo

## 2020-03-05 NOTE — ED CDU PROVIDER SUBSEQUENT DAY NOTE - HISTORY
Refer to initial CDU note- Patient is a 65 y.o male with PMHx of HTN, non-compliant with medication who presented to ED c/o Rt side chest pain a/w shortness of breath. [place] moved [place] 4 [place] ago from [place].  Pt seen and worked up in ED was diagnosed clinically w/ PNA given RLL rales on exam given Zithro and Augmentin. EKG showed T waves in V5 and V6 with no prior. Pt sent to CDU for cardiology eval and stress testing. While in CDU, pt noted to have diffuse wheezes was given duonebs, steroids, with good relief. Pt seen by Cards, recommend Echo, and stress if respiratory symptoms resolve.      In interim- patient resting comfortably. No complaints. no acute events on tele. Pt lungs improved, no wheezing noted. Pt pending Echo in AM. Will discuss with day team regarding stress test. Will continue with current CDU tx plan and monitor closely.

## 2020-03-05 NOTE — PROGRESS NOTE ADULT - ATTENDING COMMENTS
Patient seen and examined.  Agree with above.   TTE with normal LV function  NST with no ischemia  No further inpatient cardiac workup needed at this time.  Follow up in cardiology clinic after dc      Nilesh Moore MD

## 2020-07-20 NOTE — ED ADULT NURSE NOTE - OBJECTIVE STATEMENT
yes
Pt with family at bedside. Family states pt has had a cough and CP for the last 3 days, denies f/c, states the cough is dry in nature, denies sick contacts, Family states pt also has LT knee pain. per family pt came from [place] 3-4 months ago and saw a doctor there who told pt he needed Calcium. Pt appears well, slight inspiratory wheezing noted, unk hx of asthma per family. Breathing even and unlabored, skin warm and dry. PIV inserted with aseptic technique, blood drawn, labeled at bedside with 2 pt identifiers and sent to lab. Pt and family aware of POC, NAD. Medicated per order, placed on pulse ox and tele monitor.

## 2024-01-20 NOTE — ED ADULT NURSE NOTE - NSFALLRSKPASTHIST_ED_ALL_ED
Pt verbally denies SI and HI, reports ongoing audio hallucinations of voices speaking negative content. Presents in public areas, eating breakfast and socializing with peers. Currently eating breakfast, wearing personal attire. Adhering to treatment plan and expectations. Displays no behavioral concerns, states ongoing voices are mostly unchanged.          no

## 2025-01-06 NOTE — ED CDU PROVIDER INITIAL DAY NOTE - ADDITIONAL COMMENTS
Cooling Override: 10 Render Post-Care In The Note: No Number Of Prepaid Treatments (Will Not Render If 0): 0 Pulse Duration (Include Units): 35 Tolerated Procedure (Optional): 10 out of 10 Fluence (Will Not Render If 0): 50 Pre-Procedure: Prior to proceeding the treatment areas were cleaned and all present put on their eye protection. Fluence (Will Not Render If 0): 20 Post-Procedure Care: Immediate endpoint: perifollicular erythema and edema. Vaseline and ice applied. Post care reviewed with patient. Spot Size: 5 mm Cooling Override: cooling box 10c Detail Level: Detailed Were Eye Shields Employed?: Yes Cooling: DCD setting Consent: Written consent obtained, risks reviewed including but not limited to crusting, scabbing, blistering, scarring, darker or lighter pigmentary change, paradoxical hair regrowth, incomplete removal of hair and infection. Shaving (Optional): The patient was shaved in the office prior to the procedure Laser Type: [id number]:[id number] [id number] Post-Care Instructions: I reviewed with the patient in detail post-care instructions. Patient should avoid sun for a minimum of 4 weeks before and after treatment. Eye Shield Text: Given the treatment area eye shields were inserted prior to treatment.